# Patient Record
Sex: MALE | Race: WHITE | NOT HISPANIC OR LATINO
[De-identification: names, ages, dates, MRNs, and addresses within clinical notes are randomized per-mention and may not be internally consistent; named-entity substitution may affect disease eponyms.]

---

## 2023-06-13 PROBLEM — Z00.00 ENCOUNTER FOR PREVENTIVE HEALTH EXAMINATION: Status: ACTIVE | Noted: 2023-06-13

## 2023-06-15 ENCOUNTER — APPOINTMENT (OUTPATIENT)
Dept: GASTROENTEROLOGY | Facility: CLINIC | Age: 63
End: 2023-06-15
Payer: COMMERCIAL

## 2023-06-15 ENCOUNTER — NON-APPOINTMENT (OUTPATIENT)
Age: 63
End: 2023-06-15

## 2023-06-15 VITALS
HEART RATE: 58 BPM | BODY MASS INDEX: 26.51 KG/M2 | WEIGHT: 200 LBS | HEIGHT: 73 IN | DIASTOLIC BLOOD PRESSURE: 78 MMHG | SYSTOLIC BLOOD PRESSURE: 134 MMHG | RESPIRATION RATE: 18 BRPM | OXYGEN SATURATION: 98 %

## 2023-06-15 DIAGNOSIS — Z86.39 PERSONAL HISTORY OF OTHER ENDOCRINE, NUTRITIONAL AND METABOLIC DISEASE: ICD-10-CM

## 2023-06-15 DIAGNOSIS — Z86.79 PERSONAL HISTORY OF OTHER DISEASES OF THE CIRCULATORY SYSTEM: ICD-10-CM

## 2023-06-15 PROCEDURE — 99204 OFFICE O/P NEW MOD 45 MIN: CPT

## 2023-06-15 RX ORDER — AMLODIPINE BESYLATE 10 MG/1
10 TABLET ORAL
Refills: 0 | Status: ACTIVE | COMMUNITY

## 2023-06-15 RX ORDER — ATORVASTATIN CALCIUM 80 MG/1
80 TABLET, FILM COATED ORAL
Refills: 0 | Status: ACTIVE | COMMUNITY

## 2023-06-15 NOTE — HISTORY OF PRESENT ILLNESS
[FreeTextEntry1] : GURJIT HOROWITZ  is being evaluated at the request of Dr. Jordan Irizarry for an opinion re: BRBPR.\par \par Patient describes 6  months of increasing episodes of BRBPR admixed with stool. As per patient, exam by PMD documented guaiac positive stools and hemorrhoids.  Denies nausea, vomiting, fever, chils, diarrhea, constipation,melena, hematemesis, abdominal pain, unexpected weight loss\par \par \par - Colonoscopy 7/2018: hemorrhoids / diverticulosis\par -Colonoscopy 2013: hemorrhoids / diverticulosis

## 2023-06-15 NOTE — CONSULT LETTER
[Dear  ___] : Dear  [unfilled], [Consult Letter:] : I had the pleasure of evaluating your patient, [unfilled]. [Please see my note below.] : Please see my note below. [Consult Closing:] : Thank you very much for allowing me to participate in the care of this patient.  If you have any questions, please do not hesitate to contact me. [Sincerely,] : Sincerely, [FreeTextEntry3] : Scott Bañuelos MD\par tel: [phone]\par fax: [phone]\par

## 2023-06-15 NOTE — PHYSICAL EXAM
[Alert] : alert [Sclera] : the sclera and conjunctiva were normal [Hearing Threshold Finger Rub Not [place]] : hearing was normal [No Respiratory Distress] : no respiratory distress [None] : no edema [Abdomen Soft] : soft [Normal Color / Pigmentation] : normal skin color and pigmentation [Oriented To Time, Place, And Person] : oriented to person, place, and time [de-identified] : Deferred pending colonoscopy

## 2023-06-15 NOTE — ASSESSMENT
[FreeTextEntry1] : BRBPR.  Likely hemorrhoidal. Given increased frequency , will perform colonoscopy to exclude other colonic pathology\par \par Pertinent available records reviewed\par Risks of the procedure including but not limited to bleeding / perforation / infection / anesthesia complication / missed polyp or lesion explained to the  patient . The patient expressed understanding and a desire to proceed with the procedure.\par \par Risk of not doing procedure includes but is not limited to missed or delayed diagnosis of gastrointestinal pathology.\par A consultation note was provided to the referring provider\par

## 2023-06-23 ENCOUNTER — APPOINTMENT (OUTPATIENT)
Dept: GASTROENTEROLOGY | Facility: HOSPITAL | Age: 63
End: 2023-06-23

## 2023-07-11 ENCOUNTER — APPOINTMENT (OUTPATIENT)
Dept: GASTROENTEROLOGY | Facility: HOSPITAL | Age: 63
End: 2023-07-11

## 2023-07-11 DIAGNOSIS — K62.5 HEMORRHAGE OF ANUS AND RECTUM: ICD-10-CM

## 2023-07-11 RX ORDER — HYDROCORTISONE 25 MG/G
2.5 CREAM TOPICAL
Qty: 2 | Refills: 2 | Status: ACTIVE | COMMUNITY
Start: 2023-07-11 | End: 1900-01-01